# Patient Record
Sex: FEMALE | Race: WHITE | URBAN - METROPOLITAN AREA
[De-identification: names, ages, dates, MRNs, and addresses within clinical notes are randomized per-mention and may not be internally consistent; named-entity substitution may affect disease eponyms.]

---

## 2017-10-23 RX ORDER — SODIUM CHLORIDE 0.9 % (FLUSH) 0.9 %
10 SYRINGE (ML) INJECTION
Status: CANCELLED | OUTPATIENT
Start: 2017-10-25 | End: 2017-10-25

## 2017-10-23 NOTE — PRE-PROCEDURE INSTRUCTIONS
Surgery:    10/25/2017@ 1130                      Arrival time: 1000  Nothing to eat or drink after midnight unless instructed to take certain medications by the doctor or the nurse the am of surgery-specials npo 6 hr prior to arrival  Arrive at the front information desk -1st floor /[facility] is on [address] [facility]  Please leave money and all other valuables at home. Wear comfortable clothing. If you wear contacts please bring a case. No make up. You may be asked to remove rings or body piercing. Please bring insurance cards and picture ID am of procedure. Please bring any consent or paper work from your doctor. If you become ill,such as a cold, sore throat or fever contact your doctor. Please bathe or shower am of procedure.   Medications to take AM of procedure:as directed by Dr Jane Vizcarra     Any questions call [facility]  [phone]

## 2017-10-24 NOTE — PROGRESS NOTES
Pt called back and requested we call her at [phone]- called her and states she was unaware of liver bx scheduled for tomorrow- has appt with Dr Rubia Doherty in [place] in am and wants to reschedule the liver bx- transferred call to procedure   This patient is not scheduled for liver bx- another patient with same name but different date of birth is scheduled

## 2017-10-24 NOTE — PROGRESS NOTES
Attempted phone assessment- the number the office gave us [phone] called and left message- called home number in [facility] a male answered and said he would give her the message to call us back

## 2017-10-25 ENCOUNTER — HOSPITAL ENCOUNTER (OUTPATIENT)
Dept: INTERVENTIONAL RADIOLOGY/VASCULAR | Age: 52
Discharge: HOME | End: 2017-11-30
Attending: RADIOLOGY | Admitting: RADIOLOGY